# Patient Record
Sex: FEMALE | Race: WHITE | NOT HISPANIC OR LATINO | Employment: FULL TIME | URBAN - METROPOLITAN AREA
[De-identification: names, ages, dates, MRNs, and addresses within clinical notes are randomized per-mention and may not be internally consistent; named-entity substitution may affect disease eponyms.]

---

## 2024-07-31 PROBLEM — N63.21 BREAST LUMP ON LEFT SIDE AT 1 O'CLOCK POSITION: Status: ACTIVE | Noted: 2024-07-31

## 2024-07-31 NOTE — ASSESSMENT & PLAN NOTE
I did discuss the importance of having a mammogram with her today as well.  She will have a mammogram the day her biopsy is scheduled then we will proceed with her biopsy. Her US has shown a left breast mass. We will proceed with her biopsy as recommended.  Patient has been given the options of sonocore and excisional biopsy and all indications for each have been discussed. We reviewed her films and reports. She understands the reasons behind obtaining tissue in order to determine a diagnosis. She knows that further recommendations will be made after receiving the pathology report and that additional surgery/interventions may be needed. PLAN:::: LEFT SONOCORE BREAST BIOPSY. I will call her as soon as her pathology report is received

## 2024-07-31 NOTE — PROGRESS NOTES
[facility]  Dioni Silveira MD, FACS  Inga Hernandez NP-C      Date of Service: 8/1/2024    Chief Complaint:   Radha De La Garza is a 44 y.o. female presenting today after her imaging workup found a suspicious mass in the left breast for which biopsy has been recommended.    She reports no interval changes on her self-breast examination.     History of Present Illness:   Radha De La Garza is a 44 y.o. female who presents on July 31, 2024 with a left breast mass for which biopsy has been recommended.  MD::: Adan Diego MD    Past Medical History:   Diagnosis Date    Anxiety     Breast lump on left side at 1 o'clock position 07/31/2024    Herpes simplex       Past Surgical History:   Procedure Laterality Date    AUGMENTATION OF BREAST  2006    COLPOSCOPY      left pinky finger surgery 2/2024      WISDOM TOOTH EXTRACTION          Current Outpatient Medications:     fluconazole (DIFLUCAN) 150 MG Tab, Take 1 tablet (150 mg total) by mouth every 72 hours. for 2 doses, Disp: 2 tablet, Rfl: 1    ondansetron (ZOFRAN-ODT) 8 MG TbDL, , Disp: , Rfl:     promethazine (PHENERGAN) 25 MG tablet, TAKE 1 TABLET BY MOUTH EVERY 8 HOURS IF NEEDED FOR NAUSEA OR VOMITING FOR UP TO 15 DAYS., Disp: , Rfl:     valACYclovir (VALTREX) 500 MG tablet, Take 1 tablet (500 mg total) by mouth once daily., Disp: 90 tablet, Rfl: 1   Review of patient's allergies indicates:  No Known Allergies   Social History     Tobacco Use    Smoking status: Never    Smokeless tobacco: Never   Substance Use Topics    Alcohol use: Yes      Family History   Problem Relation Name Age of Onset    Hypertension Maternal Grandmother      Bone cancer Maternal Grandmother      Lung cancer Paternal Grandfather      Breast cancer Neg Hx      Ovarian cancer Neg Hx          Review of Systems   Integumentary:  Negative for color change, rash, mole/lesion, breast mass, breast discharge and breast tenderness.   Breast: Negative for mass and  tenderness       Physical Exam   Constitutional: She appears well-developed. She is cooperative.   HENT:   Head: Normocephalic.   Cardiovascular:  Normal rate and regular rhythm.            Pulmonary/Chest: She exhibits no tenderness and no bony tenderness. Right breast exhibits no mass, no nipple discharge, no skin change and no tenderness. Left breast exhibits no mass, no nipple discharge, no skin change and no tenderness.       Abdominal: Soft. Normal appearance.   Musculoskeletal: Lymphadenopathy:      Upper Body:      Right upper body: No supraclavicular or axillary adenopathy.      Left upper body: No supraclavicular or axillary adenopathy.     Neurological: She is alert.   Skin: No rash noted.          MAMMOGRAM REPORT: Not performed pt. Did not want a mammogram due to implants but will have one now    ULTRASOUND REPORT:  Bilateral 7/31/2024- Four-quadrant bilateral breast ultrasound was performed.  Patient states she does not want a mammogram.  Benefits of mammography were discussed. Right breast: Four-quadrant ultrasound reveals no abnormal findings.  Left breast:  There is an ovoid 9 x 6 x 8 mm nodule at the 1 o'clock position 4 cm from the nipple that has a slightly irregular shape with some angulated margins.  No additional findings.  Impression: Biopsy recommended for a left breast 1:00 nodule.    NOTE:::We viewed her films together at today's visit.  We discussed the multiple views obtained and the important findings.  Even benign changes were mentioned and her questions were answered.    ASSESSMENT and PLAN OF CARE     1. Breast lump on left side at 1 o'clock position  Assessment & Plan:  I did discuss the importance of having a mammogram with her today as well.  She will have a mammogram the day her biopsy is scheduled then we will proceed with her biopsy. Her US has shown a left breast mass. We will proceed with her biopsy as recommended.  Patient has been given the options of sonocore and  excisional biopsy and all indications for each have been discussed. We reviewed her films and reports. She understands the reasons behind obtaining tissue in order to determine a diagnosis. She knows that further recommendations will be made after receiving the pathology report and that additional surgery/interventions may be needed. PLAN:::: LEFT SONOCORE BREAST BIOPSY. I will call her as soon as her pathology report is received      Orders:  -     Cancel: Mammo Digital Diagnostic Bilat with Rogelio; Future; Expected date: 08/06/2024    2. Mass of upper outer quadrant of left breast  -     Ambulatory referral/consult to Breast Surgery    Medical Decision Making: It is my impression that this patient suffers all conditions contained in this medical document.  Each of these conditions did affect our plan of care and my medical decision making today.  It is my opinion that the medical decision making concerning this patient was of moderate to high difficulty based on the aforementioned conditions.  Any further recommendations will be communicated to the patient by me.  I have reviewed and verified her allergies, list of medications, medical and surgical histories, social history, and a pertinent review of symptoms.    Follow up:  I will phone her with her pathology report and make additional recommendations at that time.

## 2024-08-01 ENCOUNTER — OFFICE VISIT (OUTPATIENT)
Dept: SURGERY | Facility: CLINIC | Age: 44
End: 2024-08-01

## 2024-08-01 VITALS — BODY MASS INDEX: 18.05 KG/M2 | HEIGHT: 67 IN | WEIGHT: 115 LBS

## 2024-08-01 DIAGNOSIS — N63.21 MASS OF UPPER OUTER QUADRANT OF LEFT BREAST: ICD-10-CM

## 2024-08-01 DIAGNOSIS — N63.21 BREAST LUMP ON LEFT SIDE AT 1 O'CLOCK POSITION: Primary | ICD-10-CM

## 2024-08-01 PROCEDURE — 99213 OFFICE O/P EST LOW 20 MIN: CPT | Mod: PBBFAC,PN | Performed by: NURSE PRACTITIONER

## 2024-08-01 PROCEDURE — 99999 PR PBB SHADOW E&M-EST. PATIENT-LVL III: CPT | Mod: PBBFAC,,, | Performed by: NURSE PRACTITIONER

## 2024-08-07 ENCOUNTER — TELEPHONE (OUTPATIENT)
Dept: SURGERY | Facility: CLINIC | Age: 44
End: 2024-08-07
Payer: COMMERCIAL

## 2024-09-26 ENCOUNTER — TELEPHONE (OUTPATIENT)
Dept: SURGERY | Facility: CLINIC | Age: 44
End: 2024-09-26
Payer: COMMERCIAL

## 2024-09-26 NOTE — TELEPHONE ENCOUNTER
Called pt to see if she's ready to reschedule her biopsy and mammogram that she cancel 8/06/24. Pt stated that she wants to get another ultrasound to see if anything have change since August. Pt doesn't want to get a mammogram only ultrasound. Pt wants to come in to be seen with the provider first before any imaging and biopsy. Pt schedule appt for 11/11 with the NP.